# Patient Record
(demographics unavailable — no encounter records)

---

## 2025-07-18 NOTE — ASSESSMENT
[FreeTextEntry1] : Transcranial doppler results reviewed.  - will order MRA head and neck NOVA to further evaluate - referral to neurology for headaches - RTC to review once completed   Patient and patient's family verbalize agreement and understanding with plan of care.   I, Dr. Oleary, personally performed the evaluation and management (E/M) services for this new patient. That E/M includes conducting the initial examination, assessing all conditions, and establishing the plan of care. Today, my ACP, Hong Eller, was here to observe my evaluation and management services for this patient to be followed going forward.

## 2025-07-18 NOTE — DATA REVIEWED
[de-identified] : On 6/13/2025 -Transcranial doppler that demonstrated elevated velocities in bilateral PCA vessels and anterograde flow in bilateral vertebral arteries.

## 2025-07-18 NOTE — HISTORY OF PRESENT ILLNESS
[de-identified] : 33 year old with no PMH presenting for evaluation of intracranial flow abnormality.   Reports he intermittently experiences migraines and underwent an transcranial doppler that demonstrated elevated velocities in bilateral PCA vessels and anterograde flow in bilateral vertebral arteries.   Underwent cardiac stress testing recently.

## 2025-07-18 NOTE — DATA REVIEWED
[de-identified] : On 6/13/2025 -Transcranial doppler that demonstrated elevated velocities in bilateral PCA vessels and anterograde flow in bilateral vertebral arteries.

## 2025-07-18 NOTE — HISTORY OF PRESENT ILLNESS
[de-identified] : 33 year old with no PMH presenting for evaluation of intracranial flow abnormality.   Reports he intermittently experiences migraines and underwent an transcranial doppler that demonstrated elevated velocities in bilateral PCA vessels and anterograde flow in bilateral vertebral arteries.   Underwent cardiac stress testing recently.